# Patient Record
Sex: MALE | Race: OTHER | HISPANIC OR LATINO | URBAN - METROPOLITAN AREA
[De-identification: names, ages, dates, MRNs, and addresses within clinical notes are randomized per-mention and may not be internally consistent; named-entity substitution may affect disease eponyms.]

---

## 2021-09-26 ENCOUNTER — INPATIENT (INPATIENT)
Facility: HOSPITAL | Age: 26
LOS: 2 days | Discharge: HOME | DRG: 957 | End: 2021-09-29
Attending: SURGERY | Admitting: SURGERY
Payer: COMMERCIAL

## 2021-09-26 VITALS — OXYGEN SATURATION: 98 % | HEART RATE: 90 BPM

## 2021-09-26 DIAGNOSIS — S09.8XXA OTHER SPECIFIED INJURIES OF HEAD, INITIAL ENCOUNTER: ICD-10-CM

## 2021-09-26 LAB
ABO RH CONFIRMATION: SIGNIFICANT CHANGE UP
ALBUMIN SERPL ELPH-MCNC: 5.2 G/DL — SIGNIFICANT CHANGE UP (ref 3.3–5.2)
ALP SERPL-CCNC: 104 U/L — SIGNIFICANT CHANGE UP (ref 40–120)
ALT FLD-CCNC: 45 U/L — HIGH
AMPHET UR-MCNC: NEGATIVE — SIGNIFICANT CHANGE UP
ANION GAP SERPL CALC-SCNC: 13 MMOL/L — SIGNIFICANT CHANGE UP (ref 5–17)
ANION GAP SERPL CALC-SCNC: 36 MMOL/L — HIGH (ref 5–17)
APTT BLD: 27.7 SEC — SIGNIFICANT CHANGE UP (ref 27.5–35.5)
AST SERPL-CCNC: 75 U/L — HIGH
BARBITURATES UR SCN-MCNC: NEGATIVE — SIGNIFICANT CHANGE UP
BASOPHILS # BLD AUTO: 0 K/UL — SIGNIFICANT CHANGE UP (ref 0–0.2)
BASOPHILS # BLD AUTO: 0.02 K/UL — SIGNIFICANT CHANGE UP (ref 0–0.2)
BASOPHILS NFR BLD AUTO: 0 % — SIGNIFICANT CHANGE UP (ref 0–2)
BASOPHILS NFR BLD AUTO: 0.2 % — SIGNIFICANT CHANGE UP (ref 0–2)
BENZODIAZ UR-MCNC: NEGATIVE — SIGNIFICANT CHANGE UP
BILIRUB SERPL-MCNC: <0.2 MG/DL — LOW (ref 0.4–2)
BLD GP AB SCN SERPL QL: SIGNIFICANT CHANGE UP
BUN SERPL-MCNC: 13.7 MG/DL — SIGNIFICANT CHANGE UP (ref 8–20)
BUN SERPL-MCNC: 15 MG/DL — SIGNIFICANT CHANGE UP (ref 8–20)
CALCIUM SERPL-MCNC: 8 MG/DL — LOW (ref 8.6–10.2)
CALCIUM SERPL-MCNC: 9.3 MG/DL — SIGNIFICANT CHANGE UP (ref 8.6–10.2)
CHLORIDE SERPL-SCNC: 105 MMOL/L — SIGNIFICANT CHANGE UP (ref 98–107)
CHLORIDE SERPL-SCNC: 96 MMOL/L — LOW (ref 98–107)
CO2 SERPL-SCNC: 20 MMOL/L — LOW (ref 22–29)
CO2 SERPL-SCNC: 8 MMOL/L — CRITICAL LOW (ref 22–29)
COCAINE METAB.OTHER UR-MCNC: POSITIVE
CREAT SERPL-MCNC: 1.28 MG/DL — SIGNIFICANT CHANGE UP (ref 0.5–1.3)
CREAT SERPL-MCNC: 1.79 MG/DL — HIGH (ref 0.5–1.3)
EOSINOPHIL # BLD AUTO: 0.03 K/UL — SIGNIFICANT CHANGE UP (ref 0–0.5)
EOSINOPHIL # BLD AUTO: 0.17 K/UL — SIGNIFICANT CHANGE UP (ref 0–0.5)
EOSINOPHIL NFR BLD AUTO: 0.2 % — SIGNIFICANT CHANGE UP (ref 0–6)
EOSINOPHIL NFR BLD AUTO: 0.8 % — SIGNIFICANT CHANGE UP (ref 0–6)
ETHANOL SERPL-MCNC: 121 MG/DL — HIGH (ref 0–9)
GAS PNL BLDA: SIGNIFICANT CHANGE UP
GLUCOSE BLDC GLUCOMTR-MCNC: 102 MG/DL — HIGH (ref 70–99)
GLUCOSE BLDC GLUCOMTR-MCNC: 95 MG/DL — SIGNIFICANT CHANGE UP (ref 70–99)
GLUCOSE SERPL-MCNC: 119 MG/DL — HIGH (ref 70–99)
GLUCOSE SERPL-MCNC: 302 MG/DL — HIGH (ref 70–99)
HCT VFR BLD CALC: 33.1 % — LOW (ref 39–50)
HCT VFR BLD CALC: 35.7 % — LOW (ref 39–50)
HCT VFR BLD CALC: 36.5 % — LOW (ref 39–50)
HCT VFR BLD CALC: 49.2 % — SIGNIFICANT CHANGE UP (ref 39–50)
HGB BLD-MCNC: 11.5 G/DL — LOW (ref 13–17)
HGB BLD-MCNC: 12.3 G/DL — LOW (ref 13–17)
HGB BLD-MCNC: 12.5 G/DL — LOW (ref 13–17)
HGB BLD-MCNC: 15.6 G/DL — SIGNIFICANT CHANGE UP (ref 13–17)
IMM GRANULOCYTES NFR BLD AUTO: 0.4 % — SIGNIFICANT CHANGE UP (ref 0–1.5)
INR BLD: 0.99 RATIO — SIGNIFICANT CHANGE UP (ref 0.88–1.16)
LACTATE BLDV-MCNC: >20 MMOL/L — SIGNIFICANT CHANGE UP (ref 0.5–2)
LYMPHOCYTES # BLD AUTO: 17.8 % — SIGNIFICANT CHANGE UP (ref 13–44)
LYMPHOCYTES # BLD AUTO: 2.3 K/UL — SIGNIFICANT CHANGE UP (ref 1–3.3)
LYMPHOCYTES # BLD AUTO: 31.5 % — SIGNIFICANT CHANGE UP (ref 13–44)
LYMPHOCYTES # BLD AUTO: 6.73 K/UL — HIGH (ref 1–3.3)
MAGNESIUM SERPL-MCNC: 2.6 MG/DL — SIGNIFICANT CHANGE UP (ref 1.6–2.6)
MANUAL SMEAR VERIFICATION: SIGNIFICANT CHANGE UP
MCHC RBC-ENTMCNC: 29.8 PG — SIGNIFICANT CHANGE UP (ref 27–34)
MCHC RBC-ENTMCNC: 30 PG — SIGNIFICANT CHANGE UP (ref 27–34)
MCHC RBC-ENTMCNC: 30.2 PG — SIGNIFICANT CHANGE UP (ref 27–34)
MCHC RBC-ENTMCNC: 30.7 PG — SIGNIFICANT CHANGE UP (ref 27–34)
MCHC RBC-ENTMCNC: 31.7 GM/DL — LOW (ref 32–36)
MCHC RBC-ENTMCNC: 34.2 GM/DL — SIGNIFICANT CHANGE UP (ref 32–36)
MCHC RBC-ENTMCNC: 34.5 GM/DL — SIGNIFICANT CHANGE UP (ref 32–36)
MCHC RBC-ENTMCNC: 34.7 GM/DL — SIGNIFICANT CHANGE UP (ref 32–36)
MCV RBC AUTO: 86.4 FL — SIGNIFICANT CHANGE UP (ref 80–100)
MCV RBC AUTO: 87.1 FL — SIGNIFICANT CHANGE UP (ref 80–100)
MCV RBC AUTO: 89 FL — SIGNIFICANT CHANGE UP (ref 80–100)
MCV RBC AUTO: 95.2 FL — SIGNIFICANT CHANGE UP (ref 80–100)
METAMYELOCYTES # FLD: 4 % — HIGH (ref 0–0)
METHADONE UR-MCNC: NEGATIVE — SIGNIFICANT CHANGE UP
MONOCYTES # BLD AUTO: 0.82 K/UL — SIGNIFICANT CHANGE UP (ref 0–0.9)
MONOCYTES # BLD AUTO: 1.05 K/UL — HIGH (ref 0–0.9)
MONOCYTES NFR BLD AUTO: 4.9 % — SIGNIFICANT CHANGE UP (ref 2–14)
MONOCYTES NFR BLD AUTO: 6.4 % — SIGNIFICANT CHANGE UP (ref 2–14)
MYELOCYTES NFR BLD: 1.6 % — HIGH (ref 0–0)
NEUTROPHILS # BLD AUTO: 11.89 K/UL — HIGH (ref 1.8–7.4)
NEUTROPHILS # BLD AUTO: 9.67 K/UL — HIGH (ref 1.8–7.4)
NEUTROPHILS NFR BLD AUTO: 54.8 % — SIGNIFICANT CHANGE UP (ref 43–77)
NEUTROPHILS NFR BLD AUTO: 75 % — SIGNIFICANT CHANGE UP (ref 43–77)
NEUTS BAND # BLD: 0.8 % — SIGNIFICANT CHANGE UP (ref 0–8)
OPIATES UR-MCNC: NEGATIVE — SIGNIFICANT CHANGE UP
PCP SPEC-MCNC: SIGNIFICANT CHANGE UP
PCP UR-MCNC: NEGATIVE — SIGNIFICANT CHANGE UP
PHOSPHATE SERPL-MCNC: 3.5 MG/DL — SIGNIFICANT CHANGE UP (ref 2.4–4.7)
PLAT MORPH BLD: NORMAL — SIGNIFICANT CHANGE UP
PLATELET # BLD AUTO: 215 K/UL — SIGNIFICANT CHANGE UP (ref 150–400)
PLATELET # BLD AUTO: 223 K/UL — SIGNIFICANT CHANGE UP (ref 150–400)
PLATELET # BLD AUTO: 225 K/UL — SIGNIFICANT CHANGE UP (ref 150–400)
PLATELET # BLD AUTO: 345 K/UL — SIGNIFICANT CHANGE UP (ref 150–400)
POTASSIUM SERPL-MCNC: 3.6 MMOL/L — SIGNIFICANT CHANGE UP (ref 3.5–5.3)
POTASSIUM SERPL-MCNC: 4.1 MMOL/L — SIGNIFICANT CHANGE UP (ref 3.5–5.3)
POTASSIUM SERPL-SCNC: 3.6 MMOL/L — SIGNIFICANT CHANGE UP (ref 3.5–5.3)
POTASSIUM SERPL-SCNC: 4.1 MMOL/L — SIGNIFICANT CHANGE UP (ref 3.5–5.3)
PROT SERPL-MCNC: 8.5 G/DL — SIGNIFICANT CHANGE UP (ref 6.6–8.7)
PROTHROM AB SERPL-ACNC: 11.5 SEC — SIGNIFICANT CHANGE UP (ref 10.6–13.6)
RBC # BLD: 3.83 M/UL — LOW (ref 4.2–5.8)
RBC # BLD: 4.01 M/UL — LOW (ref 4.2–5.8)
RBC # BLD: 4.19 M/UL — LOW (ref 4.2–5.8)
RBC # BLD: 5.17 M/UL — SIGNIFICANT CHANGE UP (ref 4.2–5.8)
RBC # FLD: 12.6 % — SIGNIFICANT CHANGE UP (ref 10.3–14.5)
RBC # FLD: 13 % — SIGNIFICANT CHANGE UP (ref 10.3–14.5)
RBC # FLD: 13 % — SIGNIFICANT CHANGE UP (ref 10.3–14.5)
RBC # FLD: 13.1 % — SIGNIFICANT CHANGE UP (ref 10.3–14.5)
RBC BLD AUTO: NORMAL — SIGNIFICANT CHANGE UP
SARS-COV-2 RNA SPEC QL NAA+PROBE: SIGNIFICANT CHANGE UP
SODIUM SERPL-SCNC: 138 MMOL/L — SIGNIFICANT CHANGE UP (ref 135–145)
SODIUM SERPL-SCNC: 140 MMOL/L — SIGNIFICANT CHANGE UP (ref 135–145)
THC UR QL: POSITIVE
VARIANT LYMPHS # BLD: 1.6 % — SIGNIFICANT CHANGE UP (ref 0–6)
WBC # BLD: 12.89 K/UL — HIGH (ref 3.8–10.5)
WBC # BLD: 13.73 K/UL — HIGH (ref 3.8–10.5)
WBC # BLD: 15.69 K/UL — HIGH (ref 3.8–10.5)
WBC # BLD: 21.38 K/UL — HIGH (ref 3.8–10.5)
WBC # FLD AUTO: 12.89 K/UL — HIGH (ref 3.8–10.5)
WBC # FLD AUTO: 13.73 K/UL — HIGH (ref 3.8–10.5)
WBC # FLD AUTO: 15.69 K/UL — HIGH (ref 3.8–10.5)
WBC # FLD AUTO: 21.38 K/UL — HIGH (ref 3.8–10.5)

## 2021-09-26 PROCEDURE — 72128 CT CHEST SPINE W/O DYE: CPT | Mod: 26

## 2021-09-26 PROCEDURE — 71045 X-RAY EXAM CHEST 1 VIEW: CPT | Mod: 26

## 2021-09-26 PROCEDURE — 99291 CRITICAL CARE FIRST HOUR: CPT

## 2021-09-26 PROCEDURE — 70486 CT MAXILLOFACIAL W/O DYE: CPT | Mod: 26,MA

## 2021-09-26 PROCEDURE — 71260 CT THORAX DX C+: CPT | Mod: 26,MA

## 2021-09-26 PROCEDURE — 93010 ELECTROCARDIOGRAM REPORT: CPT

## 2021-09-26 PROCEDURE — 72125 CT NECK SPINE W/O DYE: CPT | Mod: 26,MA

## 2021-09-26 PROCEDURE — 71045 X-RAY EXAM CHEST 1 VIEW: CPT | Mod: 26,77

## 2021-09-26 PROCEDURE — 72131 CT LUMBAR SPINE W/O DYE: CPT | Mod: 26

## 2021-09-26 PROCEDURE — 74177 CT ABD & PELVIS W/CONTRAST: CPT | Mod: 26,MA

## 2021-09-26 PROCEDURE — 70450 CT HEAD/BRAIN W/O DYE: CPT | Mod: 26,MA

## 2021-09-26 RX ORDER — INSULIN LISPRO 100/ML
VIAL (ML) SUBCUTANEOUS EVERY 6 HOURS
Refills: 0 | Status: DISCONTINUED | OUTPATIENT
Start: 2021-09-26 | End: 2021-09-27

## 2021-09-26 RX ORDER — ACETAMINOPHEN 500 MG
975 TABLET ORAL EVERY 6 HOURS
Refills: 0 | Status: DISCONTINUED | OUTPATIENT
Start: 2021-09-26 | End: 2021-09-27

## 2021-09-26 RX ORDER — FENTANYL CITRATE 50 UG/ML
100 INJECTION INTRAVENOUS ONCE
Refills: 0 | Status: DISCONTINUED | OUTPATIENT
Start: 2021-09-26 | End: 2021-09-26

## 2021-09-26 RX ORDER — SODIUM CHLORIDE 9 MG/ML
1000 INJECTION, SOLUTION INTRAVENOUS ONCE
Refills: 0 | Status: COMPLETED | OUTPATIENT
Start: 2021-09-26 | End: 2021-09-26

## 2021-09-26 RX ORDER — DEXTROSE 50 % IN WATER 50 %
12.5 SYRINGE (ML) INTRAVENOUS ONCE
Refills: 0 | Status: DISCONTINUED | OUTPATIENT
Start: 2021-09-26 | End: 2021-09-27

## 2021-09-26 RX ORDER — THIAMINE MONONITRATE (VIT B1) 100 MG
500 TABLET ORAL EVERY 8 HOURS
Refills: 0 | Status: DISCONTINUED | OUTPATIENT
Start: 2021-09-26 | End: 2021-09-28

## 2021-09-26 RX ORDER — SODIUM CHLORIDE 9 MG/ML
1000 INJECTION, SOLUTION INTRAVENOUS
Refills: 0 | Status: DISCONTINUED | OUTPATIENT
Start: 2021-09-26 | End: 2021-09-28

## 2021-09-26 RX ORDER — INSULIN LISPRO 100/ML
VIAL (ML) SUBCUTANEOUS EVERY 4 HOURS
Refills: 0 | Status: DISCONTINUED | OUTPATIENT
Start: 2021-09-26 | End: 2021-09-26

## 2021-09-26 RX ORDER — CALCIUM GLUCONATE 100 MG/ML
2 VIAL (ML) INTRAVENOUS ONCE
Refills: 0 | Status: COMPLETED | OUTPATIENT
Start: 2021-09-26 | End: 2021-09-26

## 2021-09-26 RX ORDER — DEXTROSE 50 % IN WATER 50 %
25 SYRINGE (ML) INTRAVENOUS ONCE
Refills: 0 | Status: DISCONTINUED | OUTPATIENT
Start: 2021-09-26 | End: 2021-09-27

## 2021-09-26 RX ORDER — SODIUM CHLORIDE 9 MG/ML
1000 INJECTION, SOLUTION INTRAVENOUS
Refills: 0 | Status: DISCONTINUED | OUTPATIENT
Start: 2021-09-26 | End: 2021-09-27

## 2021-09-26 RX ORDER — FENTANYL CITRATE 50 UG/ML
0.5 INJECTION INTRAVENOUS
Qty: 2500 | Refills: 0 | Status: DISCONTINUED | OUTPATIENT
Start: 2021-09-26 | End: 2021-09-27

## 2021-09-26 RX ORDER — THIAMINE MONONITRATE (VIT B1) 100 MG
50 TABLET ORAL EVERY 8 HOURS
Refills: 0 | Status: DISCONTINUED | OUTPATIENT
Start: 2021-09-26 | End: 2021-09-26

## 2021-09-26 RX ORDER — CEFAZOLIN SODIUM 1 G
VIAL (EA) INJECTION
Refills: 0 | Status: DISCONTINUED | OUTPATIENT
Start: 2021-09-26 | End: 2021-09-26

## 2021-09-26 RX ORDER — FENTANYL CITRATE 50 UG/ML
50 INJECTION INTRAVENOUS ONCE
Refills: 0 | Status: DISCONTINUED | OUTPATIENT
Start: 2021-09-26 | End: 2021-09-26

## 2021-09-26 RX ORDER — CHLORHEXIDINE GLUCONATE 213 G/1000ML
15 SOLUTION TOPICAL EVERY 12 HOURS
Refills: 0 | Status: DISCONTINUED | OUTPATIENT
Start: 2021-09-26 | End: 2021-09-27

## 2021-09-26 RX ORDER — FENTANYL CITRATE 50 UG/ML
0.5 INJECTION INTRAVENOUS
Qty: 2500 | Refills: 0 | Status: DISCONTINUED | OUTPATIENT
Start: 2021-09-26 | End: 2021-09-26

## 2021-09-26 RX ORDER — THIAMINE MONONITRATE (VIT B1) 100 MG
500 TABLET ORAL EVERY 8 HOURS
Refills: 0 | Status: DISCONTINUED | OUTPATIENT
Start: 2021-09-26 | End: 2021-09-26

## 2021-09-26 RX ORDER — CEFAZOLIN SODIUM 1 G
2000 VIAL (EA) INJECTION EVERY 8 HOURS
Refills: 0 | Status: DISCONTINUED | OUTPATIENT
Start: 2021-09-26 | End: 2021-09-26

## 2021-09-26 RX ORDER — CEFAZOLIN SODIUM 1 G
2000 VIAL (EA) INJECTION ONCE
Refills: 0 | Status: COMPLETED | OUTPATIENT
Start: 2021-09-26 | End: 2021-09-26

## 2021-09-26 RX ORDER — TETANUS AND DIPHTHERIA TOXOIDS ADSORBED 2; 2 [LF]/.5ML; [LF]/.5ML
0.5 INJECTION INTRAMUSCULAR ONCE
Refills: 0 | Status: COMPLETED | OUTPATIENT
Start: 2021-09-26 | End: 2021-09-26

## 2021-09-26 RX ORDER — DEXTROSE 50 % IN WATER 50 %
15 SYRINGE (ML) INTRAVENOUS ONCE
Refills: 0 | Status: DISCONTINUED | OUTPATIENT
Start: 2021-09-26 | End: 2021-09-27

## 2021-09-26 RX ORDER — SODIUM CHLORIDE 9 MG/ML
1000 INJECTION INTRAMUSCULAR; INTRAVENOUS; SUBCUTANEOUS ONCE
Refills: 0 | Status: COMPLETED | OUTPATIENT
Start: 2021-09-26 | End: 2021-09-26

## 2021-09-26 RX ORDER — PROPOFOL 10 MG/ML
10 INJECTION, EMULSION INTRAVENOUS
Qty: 1000 | Refills: 0 | Status: DISCONTINUED | OUTPATIENT
Start: 2021-09-26 | End: 2021-09-27

## 2021-09-26 RX ORDER — PANTOPRAZOLE SODIUM 20 MG/1
40 TABLET, DELAYED RELEASE ORAL DAILY
Refills: 0 | Status: DISCONTINUED | OUTPATIENT
Start: 2021-09-26 | End: 2021-09-28

## 2021-09-26 RX ORDER — FOLIC ACID 0.8 MG
1 TABLET ORAL ONCE
Refills: 0 | Status: COMPLETED | OUTPATIENT
Start: 2021-09-26 | End: 2021-09-26

## 2021-09-26 RX ORDER — GLUCAGON INJECTION, SOLUTION 0.5 MG/.1ML
1 INJECTION, SOLUTION SUBCUTANEOUS ONCE
Refills: 0 | Status: DISCONTINUED | OUTPATIENT
Start: 2021-09-26 | End: 2021-09-27

## 2021-09-26 RX ADMIN — Medication 105 MILLIGRAM(S): at 13:23

## 2021-09-26 RX ADMIN — SODIUM CHLORIDE 2000 MILLILITER(S): 9 INJECTION, SOLUTION INTRAVENOUS at 05:08

## 2021-09-26 RX ADMIN — FENTANYL CITRATE 50 MICROGRAM(S): 50 INJECTION INTRAVENOUS at 06:00

## 2021-09-26 RX ADMIN — Medication 105 MILLIGRAM(S): at 21:56

## 2021-09-26 RX ADMIN — Medication 100 MILLIGRAM(S): at 06:35

## 2021-09-26 RX ADMIN — Medication 200 GRAM(S): at 15:00

## 2021-09-26 RX ADMIN — PROPOFOL 3.6 MICROGRAM(S)/KG/MIN: 10 INJECTION, EMULSION INTRAVENOUS at 14:39

## 2021-09-26 RX ADMIN — PROPOFOL 3.6 MICROGRAM(S)/KG/MIN: 10 INJECTION, EMULSION INTRAVENOUS at 18:12

## 2021-09-26 RX ADMIN — Medication 100 MILLIGRAM(S): at 13:19

## 2021-09-26 RX ADMIN — CHLORHEXIDINE GLUCONATE 15 MILLILITER(S): 213 SOLUTION TOPICAL at 18:12

## 2021-09-26 RX ADMIN — PROPOFOL 3.6 MICROGRAM(S)/KG/MIN: 10 INJECTION, EMULSION INTRAVENOUS at 06:43

## 2021-09-26 RX ADMIN — Medication 1 MILLIGRAM(S): at 13:22

## 2021-09-26 RX ADMIN — FENTANYL CITRATE 50 MICROGRAM(S): 50 INJECTION INTRAVENOUS at 05:58

## 2021-09-26 RX ADMIN — TETANUS AND DIPHTHERIA TOXOIDS ADSORBED 0.5 MILLILITER(S): 2; 2 INJECTION INTRAMUSCULAR at 05:40

## 2021-09-26 RX ADMIN — SODIUM CHLORIDE 100 MILLILITER(S): 9 INJECTION, SOLUTION INTRAVENOUS at 18:12

## 2021-09-26 RX ADMIN — SODIUM CHLORIDE 150 MILLILITER(S): 9 INJECTION, SOLUTION INTRAVENOUS at 08:13

## 2021-09-26 RX ADMIN — PROPOFOL 3.6 MICROGRAM(S)/KG/MIN: 10 INJECTION, EMULSION INTRAVENOUS at 08:13

## 2021-09-26 RX ADMIN — SODIUM CHLORIDE 150 MILLILITER(S): 9 INJECTION, SOLUTION INTRAVENOUS at 13:22

## 2021-09-26 RX ADMIN — SODIUM CHLORIDE 2000 MILLILITER(S): 9 INJECTION INTRAMUSCULAR; INTRAVENOUS; SUBCUTANEOUS at 06:43

## 2021-09-26 RX ADMIN — FENTANYL CITRATE 100 MICROGRAM(S): 50 INJECTION INTRAVENOUS at 05:41

## 2021-09-26 RX ADMIN — FENTANYL CITRATE 3.5 MICROGRAM(S)/KG/HR: 50 INJECTION INTRAVENOUS at 08:13

## 2021-09-26 RX ADMIN — FENTANYL CITRATE 100 MICROGRAM(S): 50 INJECTION INTRAVENOUS at 06:00

## 2021-09-26 RX ADMIN — PROPOFOL 3.6 MICROGRAM(S)/KG/MIN: 10 INJECTION, EMULSION INTRAVENOUS at 23:04

## 2021-09-26 NOTE — H&P ADULT - NSHPPHYSICALEXAM_GEN_ALL_CORE
Constitutional: Well-developed Male, moderate distress    Neurological: GCS: 13 A&O x 2; no gross sensory / motor / coordination deficits    HEENT: Head is normocephalic,+ blood from nares and oropharynx, no goodrich sign / racoon eyes, EOMI b/l, pupils 2 mm round and reactive to light b/l, Laceration to left eyebrow and R chin    Neck: cervical collar in place, trachea midline    Respiratory: Breath sounds CTA b/l respirations are unlabored, no accessory muscle use, no conversational dyspnea    Cardiovascular: No subQ emphysema or crepitus palpated    Gastrointestinal: Abdomen soft, non-tender, non-distended, no rebound tenderness / guarding, no ecchymosis or external signs of abdominal trauma    Pelvis: stable    Neurospinal: C/T/L/S spines have no tenderness to palpation, no step-offs or signs of external trauma.    Musculoskeletal: moving all extremities spontaneously. No point tenderness, instability, or gross msk deformity noted to upper or lower extremities bilaterally.  5/5 strength of upper and lower extremities bilaterally.  Monitor on R ankle.    Vascular: 2+ radial, femoral, and DP pulses b/l Constitutional: Well-developed Male, moderate distress    Neurological: GCS: 13 A&O x 2; no gross sensory / motor / coordination deficits    HEENT: Head is normocephalic,+ blood from nares and oropharynx, no goodrich sign / racoon eyes, EOMI b/l, pupils 2 mm round and reactive to light b/l, Laceration to left eyebrow and R chin, R posterior occipital laceration, R ear tragus and lobe laceration    Neck: cervical collar in place, trachea midline    Respiratory: Breath sounds CTA b/l respirations are unlabored, no accessory muscle use, no conversational dyspnea    Cardiovascular: No subQ emphysema or crepitus palpated    Gastrointestinal: Abdomen soft, non-tender, non-distended, no rebound tenderness / guarding, no ecchymosis or external signs of abdominal trauma    Pelvis: stable    Neurospinal: C/T/L/S spines have no tenderness to palpation, no step-offs or signs of external trauma.    Musculoskeletal: moving all extremities spontaneously. No point tenderness, instability, or gross msk deformity noted to upper or lower extremities bilaterally.  5/5 strength of upper and lower extremities bilaterally.  Monitor on R ankle.    Vascular: 2+ radial, femoral, and DP pulses b/l
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
## 2021-09-26 NOTE — ED PROVIDER NOTE - ADDITIONAL RISK FACTOR FREE TEXT BOX
I have personally provided 38___ minutes of critical care time exclusive of time spent on separately billable procedures. Time includes review of laboratory data, radiology results, discussion with consultants, and monitoring for potential decompensation. Interventions were performed as documented above
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
## 2021-09-26 NOTE — ED ADULT NURSE NOTE - CAS EDN DISCHARGE ASSESSMENT
GI Discharge Instructions Endoscopy      2/11/2020    During your exam, the physician:    Completed a thorough exam, no specimens were obtained.    DIET INSTRUCTIONS:  Resume your regular diet    PRESCRIPTIONS/MEDICATIONS  No new prescriptions given today    A RESPONSIBLE ADULT MUST ACCOMPANY YOU AND DRIVE YOU HOME    You had the following procedure(s) today:   Colonoscopy    1. If a biopsy and/or a polyp removal was performed today.   There is no need to hold any aspirin or anti-inflammatory medications.   2. Following sedation, your judgement, perception and coordination are impaired.      Therefore, TODAY:  · Do not drive.  Do not return to work today.  · Do not operate appliances or machinery that require quick reaction time for 24 hours.  · Do not sign legal documents or be involved in work decisions for 24 hours.  · Do not smoke or drink alcoholic beverages for 24 hours.  · Plan to spend a few hours resting before resuming your normal routine    Please call your physician in the event that you experience any of the following or proceed to the nearest hospital in the event of an emergency:     COLONOSCOPY  Fever  Severe abdominal distention or pain. Some mild distention and/or cramping are normal after these procedures but should pass within an hour or two with the passage of air.  Rectal bleeding more than blood streaking on the toilet  tissue  Nausea or Vomiting    If you have any questions or concerns, contact Dr. ABDOULAYE Rollins.    ADDITIONAL INSTRUCTIONS: see attached sheets           intubated no
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
## 2021-09-26 NOTE — PROCEDURE NOTE - ADDITIONAL PROCEDURE DETAILS
Tracheal intubation with glidescope. Blood noted in the airway requiring suction. 7.5 ETT secured at 24cm at the lip. Position confirmed with end tidal CO2, +chest excursion, and chest xray. Meds: etomidate, succinylcholine, and rocuronium
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
## 2021-09-27 LAB
A1C WITH ESTIMATED AVERAGE GLUCOSE RESULT: 5.5 % — SIGNIFICANT CHANGE UP (ref 4–5.6)
ANION GAP SERPL CALC-SCNC: 13 MMOL/L — SIGNIFICANT CHANGE UP (ref 5–17)
BASOPHILS # BLD AUTO: 0.05 K/UL — SIGNIFICANT CHANGE UP (ref 0–0.2)
BASOPHILS NFR BLD AUTO: 0.4 % — SIGNIFICANT CHANGE UP (ref 0–2)
BUN SERPL-MCNC: 17.2 MG/DL — SIGNIFICANT CHANGE UP (ref 8–20)
CALCIUM SERPL-MCNC: 8.6 MG/DL — SIGNIFICANT CHANGE UP (ref 8.6–10.2)
CHLORIDE SERPL-SCNC: 108 MMOL/L — HIGH (ref 98–107)
CK MB CFR SERPL CALC: 11.9 NG/ML — HIGH (ref 0–6.7)
CK MB CFR SERPL CALC: 6 NG/ML — SIGNIFICANT CHANGE UP (ref 0–6.7)
CK SERPL-CCNC: 5717 U/L — HIGH (ref 30–200)
CK SERPL-CCNC: 7920 U/L — HIGH (ref 30–200)
CO2 SERPL-SCNC: 20 MMOL/L — LOW (ref 22–29)
COVID-19 SPIKE DOMAIN AB INTERP: POSITIVE
COVID-19 SPIKE DOMAIN ANTIBODY RESULT: 102 U/ML — HIGH
CREAT SERPL-MCNC: 1.17 MG/DL — SIGNIFICANT CHANGE UP (ref 0.5–1.3)
EOSINOPHIL # BLD AUTO: 0.05 K/UL — SIGNIFICANT CHANGE UP (ref 0–0.5)
EOSINOPHIL NFR BLD AUTO: 0.4 % — SIGNIFICANT CHANGE UP (ref 0–6)
ESTIMATED AVERAGE GLUCOSE: 111 MG/DL — SIGNIFICANT CHANGE UP (ref 68–114)
GAS PNL BLDA: SIGNIFICANT CHANGE UP
GLUCOSE BLDC GLUCOMTR-MCNC: 122 MG/DL — HIGH (ref 70–99)
GLUCOSE BLDC GLUCOMTR-MCNC: 94 MG/DL — SIGNIFICANT CHANGE UP (ref 70–99)
GLUCOSE SERPL-MCNC: 104 MG/DL — HIGH (ref 70–99)
HCT VFR BLD CALC: 32.8 % — LOW (ref 39–50)
HGB BLD-MCNC: 11.4 G/DL — LOW (ref 13–17)
IMM GRANULOCYTES NFR BLD AUTO: 0.4 % — SIGNIFICANT CHANGE UP (ref 0–1.5)
LYMPHOCYTES # BLD AUTO: 16.4 % — SIGNIFICANT CHANGE UP (ref 13–44)
LYMPHOCYTES # BLD AUTO: 2.07 K/UL — SIGNIFICANT CHANGE UP (ref 1–3.3)
MAGNESIUM SERPL-MCNC: 2.3 MG/DL — SIGNIFICANT CHANGE UP (ref 1.6–2.6)
MCHC RBC-ENTMCNC: 29.8 PG — SIGNIFICANT CHANGE UP (ref 27–34)
MCHC RBC-ENTMCNC: 34.8 GM/DL — SIGNIFICANT CHANGE UP (ref 32–36)
MCV RBC AUTO: 85.9 FL — SIGNIFICANT CHANGE UP (ref 80–100)
MONOCYTES # BLD AUTO: 0.96 K/UL — HIGH (ref 0–0.9)
MONOCYTES NFR BLD AUTO: 7.6 % — SIGNIFICANT CHANGE UP (ref 2–14)
NEUTROPHILS # BLD AUTO: 9.47 K/UL — HIGH (ref 1.8–7.4)
NEUTROPHILS NFR BLD AUTO: 74.8 % — SIGNIFICANT CHANGE UP (ref 43–77)
PHOSPHATE SERPL-MCNC: 1.8 MG/DL — LOW (ref 2.4–4.7)
PLATELET # BLD AUTO: 213 K/UL — SIGNIFICANT CHANGE UP (ref 150–400)
POTASSIUM SERPL-MCNC: 3.6 MMOL/L — SIGNIFICANT CHANGE UP (ref 3.5–5.3)
POTASSIUM SERPL-SCNC: 3.6 MMOL/L — SIGNIFICANT CHANGE UP (ref 3.5–5.3)
RBC # BLD: 3.82 M/UL — LOW (ref 4.2–5.8)
RBC # FLD: 13.2 % — SIGNIFICANT CHANGE UP (ref 10.3–14.5)
SARS-COV-2 IGG+IGM SERPL QL IA: 102 U/ML — HIGH
SARS-COV-2 IGG+IGM SERPL QL IA: POSITIVE
SODIUM SERPL-SCNC: 141 MMOL/L — SIGNIFICANT CHANGE UP (ref 135–145)
WBC # BLD: 12.65 K/UL — HIGH (ref 3.8–10.5)
WBC # FLD AUTO: 12.65 K/UL — HIGH (ref 3.8–10.5)

## 2021-09-27 PROCEDURE — 99291 CRITICAL CARE FIRST HOUR: CPT

## 2021-09-27 RX ORDER — ENOXAPARIN SODIUM 100 MG/ML
40 INJECTION SUBCUTANEOUS
Refills: 0 | Status: DISCONTINUED | OUTPATIENT
Start: 2021-09-27 | End: 2021-09-27

## 2021-09-27 RX ORDER — POTASSIUM PHOSPHATE, MONOBASIC POTASSIUM PHOSPHATE, DIBASIC 236; 224 MG/ML; MG/ML
30 INJECTION, SOLUTION INTRAVENOUS ONCE
Refills: 0 | Status: COMPLETED | OUTPATIENT
Start: 2021-09-27 | End: 2021-09-27

## 2021-09-27 RX ORDER — OXYCODONE HYDROCHLORIDE 5 MG/1
10 TABLET ORAL EVERY 4 HOURS
Refills: 0 | Status: DISCONTINUED | OUTPATIENT
Start: 2021-09-27 | End: 2021-09-28

## 2021-09-27 RX ORDER — OXYCODONE HYDROCHLORIDE 5 MG/1
5 TABLET ORAL EVERY 4 HOURS
Refills: 0 | Status: DISCONTINUED | OUTPATIENT
Start: 2021-09-27 | End: 2021-09-28

## 2021-09-27 RX ORDER — CALCIUM GLUCONATE 100 MG/ML
2 VIAL (ML) INTRAVENOUS ONCE
Refills: 0 | Status: COMPLETED | OUTPATIENT
Start: 2021-09-27 | End: 2021-09-27

## 2021-09-27 RX ORDER — CHLORHEXIDINE GLUCONATE 213 G/1000ML
1 SOLUTION TOPICAL DAILY
Refills: 0 | Status: DISCONTINUED | OUTPATIENT
Start: 2021-09-27 | End: 2021-09-28

## 2021-09-27 RX ORDER — CHLORHEXIDINE GLUCONATE 213 G/1000ML
15 SOLUTION TOPICAL EVERY 12 HOURS
Refills: 0 | Status: DISCONTINUED | OUTPATIENT
Start: 2021-09-27 | End: 2021-09-28

## 2021-09-27 RX ORDER — FOLIC ACID 0.8 MG
1 TABLET ORAL DAILY
Refills: 0 | Status: DISCONTINUED | OUTPATIENT
Start: 2021-09-27 | End: 2021-09-28

## 2021-09-27 RX ORDER — ACETAMINOPHEN 500 MG
975 TABLET ORAL EVERY 6 HOURS
Refills: 0 | Status: DISCONTINUED | OUTPATIENT
Start: 2021-09-27 | End: 2021-09-28

## 2021-09-27 RX ADMIN — CHLORHEXIDINE GLUCONATE 1 APPLICATION(S): 213 SOLUTION TOPICAL at 11:42

## 2021-09-27 RX ADMIN — Medication 0: at 05:00

## 2021-09-27 RX ADMIN — POTASSIUM PHOSPHATE, MONOBASIC POTASSIUM PHOSPHATE, DIBASIC 83.33 MILLIMOLE(S): 236; 224 INJECTION, SOLUTION INTRAVENOUS at 08:25

## 2021-09-27 RX ADMIN — Medication 1 MILLIGRAM(S): at 11:32

## 2021-09-27 RX ADMIN — PANTOPRAZOLE SODIUM 40 MILLIGRAM(S): 20 TABLET, DELAYED RELEASE ORAL at 11:33

## 2021-09-27 RX ADMIN — Medication 975 MILLIGRAM(S): at 22:04

## 2021-09-27 RX ADMIN — Medication 200 GRAM(S): at 06:47

## 2021-09-27 RX ADMIN — Medication 105 MILLIGRAM(S): at 23:26

## 2021-09-27 RX ADMIN — Medication 1 TABLET(S): at 11:33

## 2021-09-27 RX ADMIN — Medication 105 MILLIGRAM(S): at 05:02

## 2021-09-27 RX ADMIN — PROPOFOL 3.6 MICROGRAM(S)/KG/MIN: 10 INJECTION, EMULSION INTRAVENOUS at 05:02

## 2021-09-27 RX ADMIN — Medication 105 MILLIGRAM(S): at 16:34

## 2021-09-27 RX ADMIN — SODIUM CHLORIDE 100 MILLILITER(S): 9 INJECTION, SOLUTION INTRAVENOUS at 05:02

## 2021-09-27 RX ADMIN — CHLORHEXIDINE GLUCONATE 15 MILLILITER(S): 213 SOLUTION TOPICAL at 05:02

## 2021-09-27 NOTE — PROGRESS NOTE ADULT - SUBJECTIVE AND OBJECTIVE BOX
24 HOUR EVENTS: Patient following commands, doing well. Denies pain. Patients wife came to bedside overnight. Discussed in length with her his overall hospital course and treatment plan. SBT pending this AM. CXR pending this AM. Plat <20. H/h stable. KELY improving. Lactate remains cleared.     SUBJECTIVE/ROS:  [ ] A ten-point review of systems was otherwise negative except as noted.  [x ] Due to altered mental status/intubation, subjective information were not able to be obtained from the patient. History was obtained, to the extent possible, from review of the chart and collateral sources of information.      NEURO  RASS:  1--1   GCS:   11T  CAM ICU:  Exam: No focal neuro deficits, NUÑEZ, following commands   Meds: acetaminophen   Tablet .. 975 milliGRAM(s) Oral every 6 hours PRN Temp greater or equal to 38.5C (101.3F), Mild Pain (1 - 3)  fentaNYL   Infusion 0.5 MICROgram(s)/kG/Hr IV Continuous <Continuous>  propofol Infusion 10 MICROgram(s)/kG/Min IV Continuous <Continuous>    [x] Adequacy of sedation and pain control has been assessed and adjusted      RESPIRATORY  RR: 22 (09-27-21 @ 03:00) (15 - 22)  SpO2: 100% (09-27-21 @ 04:08) (96% - 100%)  Wt(kg): --  Exam: unlabored, clear to auscultation bilaterally, breath sounds equal bilaterally   Mechanical Ventilation: Mode: AC/ CMV (Assist Control/ Continuous Mandatory Ventilation), RR (machine): 22, RR (patient): 22, TV (machine): 400, FiO2: 30, PEEP: 5, MAP: 9, PIP: 20  ABG - ( 26 Sep 2021 16:04 )  pH: 7.420 /  pCO2: 34    /  pO2: 178   / HCO3: 22    / Base Excess: -2.4  /  SaO2: 98.9    Lactate: x                [ ] Extubation Readiness Assessed  Meds:       CARDIOVASCULAR  HR: 56 (09-27-21 @ 04:08) (56 - 90)  BP: 130/76 (09-27-21 @ 03:00) (116/70 - 148/94)  BP(mean): 90 (09-27-21 @ 03:00) (84 - 111)  ABP: --  ABP(mean): --  Wt(kg): --  CVP(cm H2O): --  VBG - ( 26 Sep 2021 05:36 )  pH: x     /  pCO2: x     /  pO2: x     / HCO3: x     / Base Excess: x     /  SaO2: x      Lactate: >20.00              Exam: NSR  Cardiac Rhythm:  NSR  Perfusion     [x ]Adequate   [ ]Inadequate  Mentation   [x ]Normal       [ ]Reduced  Extremities  [x ]Warm         [ ]Cool  Volume Status [ ]Hypervolemic [x ]Euvolemic [ ]Hypovolemic  Meds:       GI/NUTRITION  Exam: soft, nttp,nd  Diet: NPO   Meds: pantoprazole  Injectable 40 milliGRAM(s) IV Push daily      GENITOURINARY  I&O's Detail    09-26 @ 07:01  -  09-27 @ 04:22  --------------------------------------------------------  IN:    FentaNYL: 60.8 mL    IV PiggyBack: 50 mL    IV PiggyBack: 100 mL    IV PiggyBack: 200 mL    multiple electrolytes Injection Type 1.: 2500 mL    Propofol: 313.9 mL  Total IN: 3224.7 mL    OUT:    Nasogastric/Oral tube (mL): 300 mL    Voided (mL): 2570 mL  Total OUT: 2870 mL    Total NET: 354.7 mL        Weight (kg): 64.6 (09-26 @ 07:15)  09-26    138  |  105  |  13.7  ----------------------------<  119<H>  4.1   |  20.0<L>  |  1.28    Ca    8.0<L>      26 Sep 2021 14:37  Phos  3.5     09-26  Mg     2.6     09-26    TPro  8.5  /  Alb  5.2  /  TBili  <0.2<L>  /  DBili  x   /  AST  75<H>  /  ALT  45<H>  /  AlkPhos  104  09-26    [x ] Polo catheter, indication: critically ill, strict i/o's   Meds: dextrose 5%. 1000 milliLiter(s) IV Continuous <Continuous>  dextrose 5%. 1000 milliLiter(s) IV Continuous <Continuous>  multiple electrolytes Injection Type 1 1000 milliLiter(s) IV Continuous <Continuous>  thiamine IVPB 500 milliGRAM(s) IV Intermittent every 8 hours    Ext: 2+ peripheral pulses, able to ROM    Skin: multiple facial lacerations with adequate skin approximation, stables to stellate lesion of R temporal     HEMATOLOGIC  Meds:   [x] VTE Prophylaxis                        11.5   12.89 )-----------( 215      ( 26 Sep 2021 22:41 )             33.1     PT/INR - ( 26 Sep 2021 05:37 )   PT: 11.5 sec;   INR: 0.99 ratio         PTT - ( 26 Sep 2021 05:37 )  PTT:27.7 sec  Transfusion     [ ] PRBC   [ ] Platelets   [ ] FFP   [ ] Cryoprecipitate      INFECTIOUS DISEASES  T(C): 38 (09-27-21 @ 02:00), Max: 38.2 (09-26-21 @ 20:00)  Wt(kg): --  WBC Count: 12.89 K/uL (09-26 @ 22:41)  WBC Count: 15.69 K/uL (09-26 @ 16:17)  WBC Count: 13.73 K/uL (09-26 @ 15:08)  WBC Count: 21.38 K/uL (09-26 @ 05:13)    Recent Cultures:    Meds:       ENDOCRINE  Capillary Blood Glucose    Meds: dextrose 40% Gel 15 Gram(s) Oral once  dextrose 50% Injectable 25 Gram(s) IV Push once  dextrose 50% Injectable 12.5 Gram(s) IV Push once  dextrose 50% Injectable 25 Gram(s) IV Push once  glucagon  Injectable 1 milliGRAM(s) IntraMuscular once  insulin lispro (ADMELOG) corrective regimen sliding scale   SubCutaneous every 6 hours        ACCESS DEVICES:  [ ] Peripheral IV  [ ] Central Venous Line	[ ] R	[ ] L	[ ] IJ	[ ] Fem	[ ] SC	Placed:   [ ] Arterial Line		[ ] R	[ ] L	[ ] Fem	[ ] Rad	[ ] Ax	Placed:   [ ] PICC:					[ ] Mediport  [ ] Urinary Catheter, Date Placed:   [ ] Necessity of urinary, arterial, and venous catheters discussed    OTHER MEDICATIONS:  chlorhexidine 0.12% Liquid 15 milliLiter(s) Oral Mucosa every 12 hours      CODE STATUS:     IMAGING:
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
## 2021-09-27 NOTE — PROGRESS NOTE ADULT - SUBJECTIVE AND OBJECTIVE BOX
Patient with mandible fracture and orbital fractures requiring ORIF  Plan for ORIF 9/28                        11.4   12.65 )-----------( 213      ( 27 Sep 2021 04:32 )             32.8   09-27    141  |  108<H>  |  17.2  ----------------------------<  104<H>  3.6   |  20.0<L>  |  1.17    Ca    8.6      27 Sep 2021 04:32  Phos  1.8     09-27  Mg     2.3     09-27    TPro  8.5  /  Alb  5.2  /  TBili  <0.2<L>  /  DBili  x   /  AST  75<H>  /  ALT  45<H>  /  AlkPhos  104  09-26  PT/INR - ( 26 Sep 2021 05:37 )   PT: 11.5 sec;   INR: 0.99 ratio         PTT - ( 26 Sep 2021 05:37 )  PTT:27.7 sec    12lead EKG with NSR  CXR reviewed, no progression of occult PTX  type and confirmatory study ordered for AM  Screening COVID PCR ordered for AM  NPO p MN  Appreciate anesthesia screening  No modifiable risk factors noted in this young otherwise healthy male.  Chikis-op ABX per cranio-facial surgeon.

## 2021-09-27 NOTE — PROGRESS NOTE ADULT - SUBJECTIVE AND OBJECTIVE BOX
SICU Downgrade Note    SUBJECTIVE:    Patient made NPO p MN for ORIF on Tuesday 9/28    Vital Signs Last 24 Hrs  T(C): 36.6 (27 Sep 2021 18:21), Max: 38.2 (26 Sep 2021 20:00)  T(F): 97.9 (27 Sep 2021 18:21), Max: 100.8 (26 Sep 2021 20:00)  HR: 71 (27 Sep 2021 18:21) (53 - 107)  BP: 127/89 (27 Sep 2021 18:21) (116/70 - 158/87)  BP(mean): 92 (27 Sep 2021 16:00) (84 - 107)  RR: 18 (27 Sep 2021 18:21) (12 - 22)  SpO2: 95% (27 Sep 2021 18:21) (95% - 100%)    I&O's Summary    26 Sep 2021 07:01  -  27 Sep 2021 07:00  --------------------------------------------------------  IN: 3670 mL / OUT: 3085 mL / NET: 585 mL    27 Sep 2021 07:01  -  27 Sep 2021 18:54  --------------------------------------------------------  IN: 1521.4 mL / OUT: 650 mL / NET: 871.4 mL      I&O's Detail    26 Sep 2021 07:01  -  27 Sep 2021 07:00  --------------------------------------------------------  IN:    FentaNYL: 73.6 mL    IV PiggyBack: 50 mL    IV PiggyBack: 100 mL    IV PiggyBack: 200 mL    multiple electrolytes Injection Type 1.: 2900 mL    Propofol: 346.4 mL  Total IN: 3670 mL    OUT:    Nasogastric/Oral tube (mL): 400 mL    Voided (mL): 2685 mL  Total OUT: 3085 mL    Total NET: 585 mL      27 Sep 2021 07:01  -  27 Sep 2021 18:54  --------------------------------------------------------  IN:    FentaNYL: 6.4 mL    IV PiggyBack: 499.8 mL    IV PiggyBack: 100 mL    multiple electrolytes Injection Type 1.: 900 mL    Propofol: 15.2 mL  Total IN: 1521.4 mL    OUT:    Voided (mL): 650 mL  Total OUT: 650 mL    Total NET: 871.4 mL    MEDICATIONS  (STANDING):  chlorhexidine 0.12% Liquid 15 milliLiter(s) Swish and Spit every 12 hours  chlorhexidine 2% Cloths 1 Application(s) Topical daily  enoxaparin Injectable 40 milliGRAM(s) SubCutaneous two times a day  folic acid 1 milliGRAM(s) Oral daily  insulin lispro (ADMELOG) corrective regimen sliding scale   SubCutaneous every 6 hours  multiple electrolytes Injection Type 1 1000 milliLiter(s) (100 mL/Hr) IV Continuous <Continuous>  multivitamin 1 Tablet(s) Oral daily  pantoprazole  Injectable 40 milliGRAM(s) IV Push daily  thiamine IVPB 500 milliGRAM(s) IV Intermittent every 8 hours    MEDICATIONS  (PRN):  acetaminophen   Tablet .. 975 milliGRAM(s) Oral every 6 hours PRN Temp greater or equal to 38.5C (101.3F), Mild Pain (1 - 3)      LABS:                        11.4   12.65 )-----------( 213      ( 27 Sep 2021 04:32 )             32.8     09-27    141  |  108<H>  |  17.2  ----------------------------<  104<H>  3.6   |  20.0<L>  |  1.17    Ca    8.6      27 Sep 2021 04:32  Phos  1.8     09-27  Mg     2.3     09-27    TPro  8.5  /  Alb  5.2  /  TBili  <0.2<L>  /  DBili  x   /  AST  75<H>  /  ALT  45<H>  /  AlkPhos  104  09-26    PT/INR - ( 26 Sep 2021 05:37 )   PT: 11.5 sec;   INR: 0.99 ratio         PTT - ( 26 Sep 2021 05:37 )  PTT:27.7 sec      RADIOLOGY & ADDITIONAL STUDIES: SICU Downgrade Note    SUBJECTIVE:  Patient seen and examined at bedside. No acute complaints. Patient made NPO p MN for ORIF on Tuesday 9/28.     Vital Signs Last 24 Hrs  T(C): 36.6 (27 Sep 2021 18:21), Max: 38.2 (26 Sep 2021 20:00)  T(F): 97.9 (27 Sep 2021 18:21), Max: 100.8 (26 Sep 2021 20:00)  HR: 71 (27 Sep 2021 18:21) (53 - 107)  BP: 127/89 (27 Sep 2021 18:21) (116/70 - 158/87)  BP(mean): 92 (27 Sep 2021 16:00) (84 - 107)  RR: 18 (27 Sep 2021 18:21) (12 - 22)  SpO2: 95% (27 Sep 2021 18:21) (95% - 100%)    I&O's Summary    26 Sep 2021 07:01  -  27 Sep 2021 07:00  --------------------------------------------------------  IN: 3670 mL / OUT: 3085 mL / NET: 585 mL    27 Sep 2021 07:01  -  27 Sep 2021 18:54  --------------------------------------------------------  IN: 1521.4 mL / OUT: 650 mL / NET: 871.4 mL      PHYSICAL EXAM  General: In no acute distress  HEENT: Head laceration with staples, site c/d/i  Respiratory: Nonlabored on RA  CV: RRR  Abdomen: Soft, non-tender, non-distended  MSK: Soft, no edema    I&O's Detail    26 Sep 2021 07:01  -  27 Sep 2021 07:00  --------------------------------------------------------  IN:    FentaNYL: 73.6 mL    IV PiggyBack: 50 mL    IV PiggyBack: 100 mL    IV PiggyBack: 200 mL    multiple electrolytes Injection Type 1.: 2900 mL    Propofol: 346.4 mL  Total IN: 3670 mL    OUT:    Nasogastric/Oral tube (mL): 400 mL    Voided (mL): 2685 mL  Total OUT: 3085 mL    Total NET: 585 mL      27 Sep 2021 07:01  -  27 Sep 2021 18:54  --------------------------------------------------------  IN:    FentaNYL: 6.4 mL    IV PiggyBack: 499.8 mL    IV PiggyBack: 100 mL    multiple electrolytes Injection Type 1.: 900 mL    Propofol: 15.2 mL  Total IN: 1521.4 mL    OUT:    Voided (mL): 650 mL  Total OUT: 650 mL    Total NET: 871.4 mL    MEDICATIONS  (STANDING):  chlorhexidine 0.12% Liquid 15 milliLiter(s) Swish and Spit every 12 hours  chlorhexidine 2% Cloths 1 Application(s) Topical daily  enoxaparin Injectable 40 milliGRAM(s) SubCutaneous two times a day  folic acid 1 milliGRAM(s) Oral daily  insulin lispro (ADMELOG) corrective regimen sliding scale   SubCutaneous every 6 hours  multiple electrolytes Injection Type 1 1000 milliLiter(s) (100 mL/Hr) IV Continuous <Continuous>  multivitamin 1 Tablet(s) Oral daily  pantoprazole  Injectable 40 milliGRAM(s) IV Push daily  thiamine IVPB 500 milliGRAM(s) IV Intermittent every 8 hours    MEDICATIONS  (PRN):  acetaminophen   Tablet .. 975 milliGRAM(s) Oral every 6 hours PRN Temp greater or equal to 38.5C (101.3F), Mild Pain (1 - 3)      LABS:                        11.4   12.65 )-----------( 213      ( 27 Sep 2021 04:32 )             32.8     09-27    141  |  108<H>  |  17.2  ----------------------------<  104<H>  3.6   |  20.0<L>  |  1.17    Ca    8.6      27 Sep 2021 04:32  Phos  1.8     09-27  Mg     2.3     09-27    TPro  8.5  /  Alb  5.2  /  TBili  <0.2<L>  /  DBili  x   /  AST  75<H>  /  ALT  45<H>  /  AlkPhos  104  09-26    PT/INR - ( 26 Sep 2021 05:37 )   PT: 11.5 sec;   INR: 0.99 ratio         PTT - ( 26 Sep 2021 05:37 )  PTT:27.7 sec      RADIOLOGY & ADDITIONAL STUDIES:
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
## 2021-09-27 NOTE — CONSULT NOTE ADULT - ASSESSMENT
[place] is a 40 year old male presenting with L orbital, ZMX, and mandibular fx    - Discussed fractures with pt at length. Fractures are displaced, and surgical intervention is warranted. Discussed surgical procedure, steps, alternatives and postoperative care at length. Pt would like to proceed with surgical intervention.  - ORIF of multiple L sided facial fracture will be done tomorrow PM by Dr. Renteria, or Dr. Humphries  - NPO after midnight. COVID negative.  - Begin Peridex QID x 10 days  - Postop, begin Abx, medrol dose rosemarie, and Peridex QID.  - please call with any questions or concerns [phone]

## 2021-09-27 NOTE — PROGRESS NOTE ADULT - ATTENDING COMMENTS
Patient seen and examined on am rounds. Sedated turned off, pt extubated. Stable for tx out of ICU, awaiting plastics recs
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
## 2021-09-28 LAB
ANION GAP SERPL CALC-SCNC: 11 MMOL/L — SIGNIFICANT CHANGE UP (ref 5–17)
APTT BLD: 26.5 SEC — LOW (ref 27.5–35.5)
BASOPHILS # BLD AUTO: 0.03 K/UL — SIGNIFICANT CHANGE UP (ref 0–0.2)
BASOPHILS NFR BLD AUTO: 0.3 % — SIGNIFICANT CHANGE UP (ref 0–2)
BLD GP AB SCN SERPL QL: SIGNIFICANT CHANGE UP
BUN SERPL-MCNC: 10.1 MG/DL — SIGNIFICANT CHANGE UP (ref 8–20)
CALCIUM SERPL-MCNC: 9.1 MG/DL — SIGNIFICANT CHANGE UP (ref 8.6–10.2)
CHLORIDE SERPL-SCNC: 105 MMOL/L — SIGNIFICANT CHANGE UP (ref 98–107)
CO2 SERPL-SCNC: 23 MMOL/L — SIGNIFICANT CHANGE UP (ref 22–29)
CREAT SERPL-MCNC: 0.9 MG/DL — SIGNIFICANT CHANGE UP (ref 0.5–1.3)
EOSINOPHIL # BLD AUTO: 0.13 K/UL — SIGNIFICANT CHANGE UP (ref 0–0.5)
EOSINOPHIL NFR BLD AUTO: 1.3 % — SIGNIFICANT CHANGE UP (ref 0–6)
GLUCOSE SERPL-MCNC: 95 MG/DL — SIGNIFICANT CHANGE UP (ref 70–99)
HCT VFR BLD CALC: 32.5 % — LOW (ref 39–50)
HGB BLD-MCNC: 11 G/DL — LOW (ref 13–17)
IMM GRANULOCYTES NFR BLD AUTO: 0.4 % — SIGNIFICANT CHANGE UP (ref 0–1.5)
INR BLD: 1.07 RATIO — SIGNIFICANT CHANGE UP (ref 0.88–1.16)
LYMPHOCYTES # BLD AUTO: 1.92 K/UL — SIGNIFICANT CHANGE UP (ref 1–3.3)
LYMPHOCYTES # BLD AUTO: 19.4 % — SIGNIFICANT CHANGE UP (ref 13–44)
MAGNESIUM SERPL-MCNC: 2.1 MG/DL — SIGNIFICANT CHANGE UP (ref 1.8–2.6)
MCHC RBC-ENTMCNC: 30.4 PG — SIGNIFICANT CHANGE UP (ref 27–34)
MCHC RBC-ENTMCNC: 33.8 GM/DL — SIGNIFICANT CHANGE UP (ref 32–36)
MCV RBC AUTO: 89.8 FL — SIGNIFICANT CHANGE UP (ref 80–100)
MONOCYTES # BLD AUTO: 0.61 K/UL — SIGNIFICANT CHANGE UP (ref 0–0.9)
MONOCYTES NFR BLD AUTO: 6.2 % — SIGNIFICANT CHANGE UP (ref 2–14)
NEUTROPHILS # BLD AUTO: 7.18 K/UL — SIGNIFICANT CHANGE UP (ref 1.8–7.4)
NEUTROPHILS NFR BLD AUTO: 72.4 % — SIGNIFICANT CHANGE UP (ref 43–77)
PHOSPHATE SERPL-MCNC: 2.6 MG/DL — SIGNIFICANT CHANGE UP (ref 2.4–4.7)
PLATELET # BLD AUTO: 198 K/UL — SIGNIFICANT CHANGE UP (ref 150–400)
POTASSIUM SERPL-MCNC: 3.5 MMOL/L — SIGNIFICANT CHANGE UP (ref 3.5–5.3)
POTASSIUM SERPL-SCNC: 3.5 MMOL/L — SIGNIFICANT CHANGE UP (ref 3.5–5.3)
PROTHROM AB SERPL-ACNC: 12.4 SEC — SIGNIFICANT CHANGE UP (ref 10.6–13.6)
RBC # BLD: 3.62 M/UL — LOW (ref 4.2–5.8)
RBC # FLD: 13 % — SIGNIFICANT CHANGE UP (ref 10.3–14.5)
SARS-COV-2 RNA SPEC QL NAA+PROBE: SIGNIFICANT CHANGE UP
SODIUM SERPL-SCNC: 139 MMOL/L — SIGNIFICANT CHANGE UP (ref 135–145)
WBC # BLD: 9.91 K/UL — SIGNIFICANT CHANGE UP (ref 3.8–10.5)
WBC # FLD AUTO: 9.91 K/UL — SIGNIFICANT CHANGE UP (ref 3.8–10.5)

## 2021-09-28 PROCEDURE — 99232 SBSQ HOSP IP/OBS MODERATE 35: CPT

## 2021-09-28 RX ORDER — OXYCODONE HYDROCHLORIDE 5 MG/1
10 TABLET ORAL EVERY 4 HOURS
Refills: 0 | Status: DISCONTINUED | OUTPATIENT
Start: 2021-09-28 | End: 2021-09-29

## 2021-09-28 RX ORDER — OXYCODONE HYDROCHLORIDE 5 MG/1
10 TABLET ORAL EVERY 4 HOURS
Refills: 0 | Status: DISCONTINUED | OUTPATIENT
Start: 2021-09-28 | End: 2021-09-28

## 2021-09-28 RX ORDER — CEFAZOLIN SODIUM 1 G
2000 VIAL (EA) INJECTION EVERY 8 HOURS
Refills: 0 | Status: DISCONTINUED | OUTPATIENT
Start: 2021-09-28 | End: 2021-09-29

## 2021-09-28 RX ORDER — INSULIN LISPRO 100/ML
VIAL (ML) SUBCUTANEOUS EVERY 4 HOURS
Refills: 0 | Status: DISCONTINUED | OUTPATIENT
Start: 2021-09-28 | End: 2021-09-28

## 2021-09-28 RX ORDER — KETOROLAC TROMETHAMINE 30 MG/ML
30 SYRINGE (ML) INJECTION ONCE
Refills: 0 | Status: DISCONTINUED | OUTPATIENT
Start: 2021-09-28 | End: 2021-09-28

## 2021-09-28 RX ORDER — HYDROMORPHONE HYDROCHLORIDE 2 MG/ML
0.5 INJECTION INTRAMUSCULAR; INTRAVENOUS; SUBCUTANEOUS
Refills: 0 | Status: DISCONTINUED | OUTPATIENT
Start: 2021-09-28 | End: 2021-09-28

## 2021-09-28 RX ORDER — ACETAMINOPHEN 500 MG
975 TABLET ORAL EVERY 6 HOURS
Refills: 0 | Status: DISCONTINUED | OUTPATIENT
Start: 2021-09-28 | End: 2021-09-29

## 2021-09-28 RX ORDER — SODIUM CHLORIDE 9 MG/ML
1000 INJECTION, SOLUTION INTRAVENOUS
Refills: 0 | Status: DISCONTINUED | OUTPATIENT
Start: 2021-09-28 | End: 2021-09-28

## 2021-09-28 RX ORDER — OXYCODONE HYDROCHLORIDE 5 MG/1
5 TABLET ORAL EVERY 4 HOURS
Refills: 0 | Status: DISCONTINUED | OUTPATIENT
Start: 2021-09-28 | End: 2021-09-28

## 2021-09-28 RX ORDER — DEXTROSE 50 % IN WATER 50 %
15 SYRINGE (ML) INTRAVENOUS ONCE
Refills: 0 | Status: DISCONTINUED | OUTPATIENT
Start: 2021-09-28 | End: 2021-09-28

## 2021-09-28 RX ORDER — ONDANSETRON 8 MG/1
4 TABLET, FILM COATED ORAL ONCE
Refills: 0 | Status: DISCONTINUED | OUTPATIENT
Start: 2021-09-28 | End: 2021-09-28

## 2021-09-28 RX ORDER — FOLIC ACID 0.8 MG
1 TABLET ORAL DAILY
Refills: 0 | Status: DISCONTINUED | OUTPATIENT
Start: 2021-09-28 | End: 2021-09-29

## 2021-09-28 RX ORDER — CHLORHEXIDINE GLUCONATE 213 G/1000ML
15 SOLUTION TOPICAL EVERY 12 HOURS
Refills: 0 | Status: DISCONTINUED | OUTPATIENT
Start: 2021-09-28 | End: 2021-09-29

## 2021-09-28 RX ORDER — OXYCODONE HYDROCHLORIDE 5 MG/1
5 TABLET ORAL EVERY 4 HOURS
Refills: 0 | Status: DISCONTINUED | OUTPATIENT
Start: 2021-09-28 | End: 2021-09-29

## 2021-09-28 RX ORDER — DEXTROSE 50 % IN WATER 50 %
25 SYRINGE (ML) INTRAVENOUS ONCE
Refills: 0 | Status: DISCONTINUED | OUTPATIENT
Start: 2021-09-28 | End: 2021-09-28

## 2021-09-28 RX ORDER — ENOXAPARIN SODIUM 100 MG/ML
30 INJECTION SUBCUTANEOUS
Refills: 0 | Status: DISCONTINUED | OUTPATIENT
Start: 2021-09-28 | End: 2021-09-29

## 2021-09-28 RX ORDER — PANTOPRAZOLE SODIUM 20 MG/1
40 TABLET, DELAYED RELEASE ORAL DAILY
Refills: 0 | Status: DISCONTINUED | OUTPATIENT
Start: 2021-09-28 | End: 2021-09-29

## 2021-09-28 RX ORDER — ENOXAPARIN SODIUM 100 MG/ML
40 INJECTION SUBCUTANEOUS
Refills: 0 | Status: DISCONTINUED | OUTPATIENT
Start: 2021-09-28 | End: 2021-09-28

## 2021-09-28 RX ADMIN — CHLORHEXIDINE GLUCONATE 15 MILLILITER(S): 213 SOLUTION TOPICAL at 05:59

## 2021-09-28 RX ADMIN — Medication 975 MILLIGRAM(S): at 00:04

## 2021-09-28 RX ADMIN — Medication 975 MILLIGRAM(S): at 18:29

## 2021-09-28 RX ADMIN — SODIUM CHLORIDE 100 MILLILITER(S): 9 INJECTION, SOLUTION INTRAVENOUS at 05:59

## 2021-09-28 RX ADMIN — Medication 105 MILLIGRAM(S): at 05:59

## 2021-09-28 RX ADMIN — CHLORHEXIDINE GLUCONATE 15 MILLILITER(S): 213 SOLUTION TOPICAL at 18:29

## 2021-09-28 RX ADMIN — Medication 100 MILLIGRAM(S): at 22:44

## 2021-09-28 RX ADMIN — ENOXAPARIN SODIUM 30 MILLIGRAM(S): 100 INJECTION SUBCUTANEOUS at 18:30

## 2021-09-28 RX ADMIN — Medication 24 MILLIGRAM(S): at 22:43

## 2021-09-28 RX ADMIN — Medication 975 MILLIGRAM(S): at 00:00

## 2021-09-28 NOTE — BRIEF OPERATIVE NOTE - OPERATION/FINDINGS
Left ZMC fracture and left mandibular condyle fracture. Left ZMC fracture trereated with plates and screws,  condylar fracture treated with closed reduction and MMF
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
## 2021-09-28 NOTE — CHART NOTE - NSCHARTNOTEFT_GEN_A_CORE
Post-op Check    Subjective:  Pt offers no acute complaints at this time. Pain well controlled on current regiment. Denies chest pain, SOB, palpitations. Patient facial swelling stable, wire cutters at bedside, on medrol dose rosemarie, peridex and abx.    STATUS POST:  Left ZMC fracture and left mandibular condyle fracture. Left ZMC fracture trereated with plates and screws,  condylar fracture treated with closed reduction and MMF    POST OPERATIVE DAY #: 0    MEDICATIONS  (STANDING):  acetaminophen   Tablet .. 975 milliGRAM(s) Oral every 6 hours  ceFAZolin   IVPB 2000 milliGRAM(s) IV Intermittent every 8 hours  chlorhexidine 0.12% Liquid 15 milliLiter(s) Oral Mucosa every 12 hours  enoxaparin Injectable 30 milliGRAM(s) SubCutaneous two times a day  folic acid 1 milliGRAM(s) Oral daily  multivitamin 1 Tablet(s) Oral daily  pantoprazole  Injectable 40 milliGRAM(s) IV Push daily    MEDICATIONS  (PRN):  oxyCODONE    Solution 5 milliGRAM(s) Oral every 4 hours PRN Moderate Pain (4 - 6)  oxyCODONE    Solution 10 milliGRAM(s) Oral every 4 hours PRN Severe Pain (7 - 10)      Vital Signs Last 24 Hrs  T(C): 37 (28 Sep 2021 15:30), Max: 37.2 (28 Sep 2021 11:06)  T(F): 98.6 (28 Sep 2021 15:30), Max: 99 (28 Sep 2021 11:06)  HR: 67 (28 Sep 2021 16:09) (61 - 99)  BP: 141/79 (28 Sep 2021 15:37) (119/75 - 154/92)  BP(mean): 93 (28 Sep 2021 15:37) (93 - 106)  RR: 16 (28 Sep 2021 16:09) (14 - 22)  SpO2: 100% (28 Sep 2021 16:09) (94% - 100%)    Physical Exam:    Constitutional: NAD  HEENT: facial swelling stable resolving, multiple scattered abrasions/ lacerations stable, sutures well seated   Neck: No JVD, FROM without pain  Respiratory: no accessory muscle use, respirations non-labored  GI: abdomen soft, non-tender, atraumatic   Neurological: A&O x 3; without gross deficit    A:     P:  Continue current care  wire cutters at bedside  abx  medrol dose rosemarie  Pain control  ice pack   OOB as tolerated  Encourage IS  DVT ppx

## 2021-09-29 VITALS
HEART RATE: 51 BPM | RESPIRATION RATE: 18 BRPM | TEMPERATURE: 99 F | DIASTOLIC BLOOD PRESSURE: 89 MMHG | SYSTOLIC BLOOD PRESSURE: 136 MMHG | OXYGEN SATURATION: 94 %

## 2021-09-29 PROCEDURE — 85025 COMPLETE CBC W/AUTO DIFF WBC: CPT

## 2021-09-29 PROCEDURE — C1889: CPT

## 2021-09-29 PROCEDURE — 82962 GLUCOSE BLOOD TEST: CPT

## 2021-09-29 PROCEDURE — 82435 ASSAY OF BLOOD CHLORIDE: CPT

## 2021-09-29 PROCEDURE — 80307 DRUG TEST PRSMV CHEM ANLYZR: CPT

## 2021-09-29 PROCEDURE — 84132 ASSAY OF SERUM POTASSIUM: CPT

## 2021-09-29 PROCEDURE — C9399: CPT

## 2021-09-29 PROCEDURE — 85610 PROTHROMBIN TIME: CPT

## 2021-09-29 PROCEDURE — 96375 TX/PRO/DX INJ NEW DRUG ADDON: CPT

## 2021-09-29 PROCEDURE — 86900 BLOOD TYPING SEROLOGIC ABO: CPT

## 2021-09-29 PROCEDURE — 71260 CT THORAX DX C+: CPT | Mod: MA

## 2021-09-29 PROCEDURE — 71045 X-RAY EXAM CHEST 1 VIEW: CPT

## 2021-09-29 PROCEDURE — 86901 BLOOD TYPING SEROLOGIC RH(D): CPT

## 2021-09-29 PROCEDURE — 85014 HEMATOCRIT: CPT

## 2021-09-29 PROCEDURE — 82947 ASSAY GLUCOSE BLOOD QUANT: CPT

## 2021-09-29 PROCEDURE — C1713: CPT

## 2021-09-29 PROCEDURE — 85027 COMPLETE CBC AUTOMATED: CPT

## 2021-09-29 PROCEDURE — 74177 CT ABD & PELVIS W/CONTRAST: CPT | Mod: MA

## 2021-09-29 PROCEDURE — 84484 ASSAY OF TROPONIN QUANT: CPT

## 2021-09-29 PROCEDURE — 99232 SBSQ HOSP IP/OBS MODERATE 35: CPT

## 2021-09-29 PROCEDURE — U0003: CPT

## 2021-09-29 PROCEDURE — 93005 ELECTROCARDIOGRAM TRACING: CPT

## 2021-09-29 PROCEDURE — 70450 CT HEAD/BRAIN W/O DYE: CPT | Mod: MA

## 2021-09-29 PROCEDURE — 80048 BASIC METABOLIC PNL TOTAL CA: CPT

## 2021-09-29 PROCEDURE — 72125 CT NECK SPINE W/O DYE: CPT | Mod: MA

## 2021-09-29 PROCEDURE — 84100 ASSAY OF PHOSPHORUS: CPT

## 2021-09-29 PROCEDURE — 85018 HEMOGLOBIN: CPT

## 2021-09-29 PROCEDURE — 82803 BLOOD GASES ANY COMBINATION: CPT

## 2021-09-29 PROCEDURE — 94003 VENT MGMT INPAT SUBQ DAY: CPT

## 2021-09-29 PROCEDURE — 82553 CREATINE MB FRACTION: CPT

## 2021-09-29 PROCEDURE — 85730 THROMBOPLASTIN TIME PARTIAL: CPT

## 2021-09-29 PROCEDURE — 36415 COLL VENOUS BLD VENIPUNCTURE: CPT

## 2021-09-29 PROCEDURE — 86769 SARS-COV-2 COVID-19 ANTIBODY: CPT

## 2021-09-29 PROCEDURE — 82330 ASSAY OF CALCIUM: CPT

## 2021-09-29 PROCEDURE — 86850 RBC ANTIBODY SCREEN: CPT

## 2021-09-29 PROCEDURE — 94002 VENT MGMT INPAT INIT DAY: CPT

## 2021-09-29 PROCEDURE — U0005: CPT

## 2021-09-29 PROCEDURE — 90714 TD VACC NO PRESV 7 YRS+ IM: CPT

## 2021-09-29 PROCEDURE — 99285 EMERGENCY DEPT VISIT HI MDM: CPT

## 2021-09-29 PROCEDURE — 83735 ASSAY OF MAGNESIUM: CPT

## 2021-09-29 PROCEDURE — 83605 ASSAY OF LACTIC ACID: CPT

## 2021-09-29 PROCEDURE — 80053 COMPREHEN METABOLIC PANEL: CPT

## 2021-09-29 PROCEDURE — 94760 N-INVAS EAR/PLS OXIMETRY 1: CPT

## 2021-09-29 PROCEDURE — 82550 ASSAY OF CK (CPK): CPT

## 2021-09-29 PROCEDURE — 84295 ASSAY OF SERUM SODIUM: CPT

## 2021-09-29 PROCEDURE — 70486 CT MAXILLOFACIAL W/O DYE: CPT | Mod: MA

## 2021-09-29 PROCEDURE — 83036 HEMOGLOBIN GLYCOSYLATED A1C: CPT

## 2021-09-29 PROCEDURE — 96374 THER/PROPH/DIAG INJ IV PUSH: CPT

## 2021-09-29 RX ORDER — ACETAMINOPHEN 500 MG
975 TABLET ORAL EVERY 6 HOURS
Refills: 0 | Status: DISCONTINUED | OUTPATIENT
Start: 2021-09-29 | End: 2021-09-29

## 2021-09-29 RX ORDER — ACETAMINOPHEN 500 MG
30.47 TABLET ORAL
Qty: 0 | Refills: 0 | DISCHARGE
Start: 2021-09-29

## 2021-09-29 RX ORDER — OXYCODONE HYDROCHLORIDE 5 MG/1
5 TABLET ORAL
Qty: 150 | Refills: 0
Start: 2021-09-29 | End: 2021-10-03

## 2021-09-29 RX ORDER — IBUPROFEN 200 MG
20 TABLET ORAL
Qty: 0 | Refills: 0 | DISCHARGE
Start: 2021-09-29

## 2021-09-29 RX ORDER — IBUPROFEN 200 MG
400 TABLET ORAL EVERY 6 HOURS
Refills: 0 | Status: DISCONTINUED | OUTPATIENT
Start: 2021-09-29 | End: 2021-09-29

## 2021-09-29 RX ORDER — OXYCODONE HYDROCHLORIDE 5 MG/1
5 TABLET ORAL
Qty: 0 | Refills: 0 | DISCHARGE
Start: 2021-09-29

## 2021-09-29 RX ORDER — CHLORHEXIDINE GLUCONATE 213 G/1000ML
15 SOLUTION TOPICAL
Qty: 0 | Refills: 0 | DISCHARGE
Start: 2021-09-29

## 2021-09-29 RX ORDER — CHLORHEXIDINE GLUCONATE 213 G/1000ML
15 SOLUTION TOPICAL
Qty: 210 | Refills: 0
Start: 2021-09-29 | End: 2021-10-05

## 2021-09-29 RX ADMIN — Medication 1 TABLET(S): at 13:34

## 2021-09-29 RX ADMIN — Medication 1 MILLIGRAM(S): at 13:34

## 2021-09-29 RX ADMIN — Medication 4 MILLIGRAM(S): at 13:33

## 2021-09-29 RX ADMIN — PANTOPRAZOLE SODIUM 40 MILLIGRAM(S): 20 TABLET, DELAYED RELEASE ORAL at 13:33

## 2021-09-29 RX ADMIN — Medication 4 MILLIGRAM(S): at 06:01

## 2021-09-29 RX ADMIN — Medication 975 MILLIGRAM(S): at 13:36

## 2021-09-29 RX ADMIN — Medication 100 MILLIGRAM(S): at 05:53

## 2021-09-29 NOTE — ED PROCEDURE NOTE - PROCEDURE ADDITIONAL DETAILS
Tracheal intubation with glidescope. Blood noted in the airway requiring suction. 7.5 ETT secured at 24cm at the lip. Position confirmed with end tidal CO2. +chest excursion, and chest xray. Meds: etomidate, succinylcholine, and rocuronium

## 2021-09-29 NOTE — DISCHARGE NOTE PROVIDER - NSDCMRMEDTOKEN_GEN_ALL_CORE_FT
acetaminophen 160 mg/5 mL oral suspension: 30.47 milliliter(s) orally every 6 hours  chlorhexidine 0.12% mucous membrane liquid: 15 milliliter(s) mucous membrane every 12 hours  ibuprofen 100 mg/5 mL oral suspension: 20 milliliter(s) orally every 6 hours   acetaminophen 160 mg/5 mL oral suspension: 30.47 milliliter(s) orally every 6 hours  chlorhexidine 0.12% mucous membrane liquid: 15 milliliter(s) mucous membrane every 12 hours  ibuprofen 100 mg/5 mL oral suspension: 20 milliliter(s) orally every 6 hours  oxyCODONE 5 mg/5 mL oral solution: 5 milliliter(s) orally every 4 hours, As needed, Moderate Pain (4 - 6)

## 2021-09-29 NOTE — CHART NOTE - NSCHARTNOTEFT_GEN_A_CORE
Nutrition Note: Aware pt downgraded from ICU. Chart reviewed; RD to follow up with full nutrition assessment per medical floor protocol.
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
## 2021-09-29 NOTE — DISCHARGE NOTE NURSING/CASE MANAGEMENT/SOCIAL WORK - PATIENT PORTAL LINK FT
You can access the FollowMyHealth Patient Portal offered by [facility] by registering at the following website: [email or web address]. By joining [employer]’s FollowMyHealth portal, you will also be able to view your health information using other applications (apps) compatible with our system.
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
## 2021-09-29 NOTE — DISCHARGE NOTE PROVIDER - NSFOLLOWUPCLINICS_GEN_ALL_ED_FT
[facility] Acute Care Surgery  Acute Care Surgery  [address] [place] [id number]  Phone: [phone]  Fax:   Follow Up Time: 1-3 days

## 2021-09-29 NOTE — PROGRESS NOTE ADULT - SUBJECTIVE AND OBJECTIVE BOX
INTERVAL HPI/OVERNIGHT EVENTS:    No acute events overnight.     MEDICATIONS  (STANDING):  acetaminophen   Tablet .. 975 milliGRAM(s) Oral every 6 hours  ceFAZolin   IVPB 2000 milliGRAM(s) IV Intermittent every 8 hours  chlorhexidine 0.12% Liquid 15 milliLiter(s) Oral Mucosa every 12 hours  enoxaparin Injectable 30 milliGRAM(s) SubCutaneous two times a day  folic acid 1 milliGRAM(s) Oral daily  methylPREDNISolone   Oral   methylPREDNISolone 4 milliGRAM(s) Oral before breakfast  methylPREDNISolone 4 milliGRAM(s) Oral after lunch  methylPREDNISolone 4 milliGRAM(s) Oral after dinner  methylPREDNISolone 8 milliGRAM(s) Oral at bedtime  multivitamin 1 Tablet(s) Oral daily  pantoprazole  Injectable 40 milliGRAM(s) IV Push daily    MEDICATIONS  (PRN):  oxyCODONE    Solution 5 milliGRAM(s) Oral every 4 hours PRN Moderate Pain (4 - 6)  oxyCODONE    Solution 10 milliGRAM(s) Oral every 4 hours PRN Severe Pain (7 - 10)      Vital Signs Last 24 Hrs  T(C): 37.2 (28 Sep 2021 20:05), Max: 37.2 (28 Sep 2021 11:06)  T(F): 98.9 (28 Sep 2021 20:05), Max: 99 (28 Sep 2021 11:06)  HR: 76 (28 Sep 2021 20:05) (61 - 99)  BP: 134/84 (29 Sep 2021 00:47) (131/82 - 154/92)  BP(mean): 93 (28 Sep 2021 15:37) (93 - 106)  RR: 18 (29 Sep 2021 00:47) (14 - 22)  SpO2: 96% (29 Sep 2021 00:47) (95% - 100%)    Constitutional: NAD  HEENT: facial swelling stable resolving, multiple scattered abrasions/ lacerations stable, sutures well seated   Neck: No JVD, FROM without pain  Respiratory: no accessory muscle use, respirations non-labored  GI: abdomen soft, non-tender, atraumatic   Neurological: A&O x 3; without gross deficit    I&O's Detail    27 Sep 2021 07:01  -  28 Sep 2021 07:00  --------------------------------------------------------  IN:    FentaNYL: 6.4 mL    IV PiggyBack: 499.8 mL    IV PiggyBack: 100 mL    multiple electrolytes Injection Type 1.: 900 mL    Propofol: 15.2 mL  Total IN: 1521.4 mL    OUT:    Voided (mL): 650 mL  Total OUT: 650 mL    Total NET: 871.4 mL          LABS:                        11.0   9.91  )-----------( 198      ( 28 Sep 2021 08:43 )             32.5     09-28    139  |  105  |  10.1  ----------------------------<  95  3.5   |  23.0  |  0.90    Ca    9.1      28 Sep 2021 08:43  Phos  2.6     09-28  Mg     2.1     09-28      PT/INR - ( 28 Sep 2021 08:43 )   PT: 12.4 sec;   INR: 1.07 ratio         PTT - ( 28 Sep 2021 08:43 )  PTT:26.5 sec      RADIOLOGY & ADDITIONAL STUDIES:

## 2021-09-29 NOTE — PROGRESS NOTE ADULT - ASSESSMENT
POD1 s/p L ORIF of L ZMC, closed reduction and MMF for mandibular condyle fx    -wirecutters at bedside  -continue abx  -continue peridex rinses  -continue liquid diet

## 2021-09-29 NOTE — DISCHARGE NOTE NURSING/CASE MANAGEMENT/SOCIAL WORK - NSDCVIVACCINE_GEN_ALL_CORE_FT
Td (adult) preservative free; 26-Sep-2021 05:40; Adelina Ceja (LEW); [employer]; [id number] (Exp. Date: 15-Jul-2023); IntraMuscular; Deltoid Left.; 0.5 milliLiter(s); VIS (VIS Published: 12-Sep-2021, VIS Presented: 26-Sep-2021);

## 2021-10-01 ENCOUNTER — EMERGENCY (EMERGENCY)
Facility: HOSPITAL | Age: 26
LOS: 1 days | Discharge: HOME | End: 2021-10-01
Attending: EMERGENCY MEDICINE
Payer: COMMERCIAL

## 2021-10-01 VITALS
RESPIRATION RATE: 20 BRPM | OXYGEN SATURATION: 99 % | WEIGHT: 139.99 LBS | HEIGHT: 68 IN | TEMPERATURE: 99 F | SYSTOLIC BLOOD PRESSURE: 151 MMHG | DIASTOLIC BLOOD PRESSURE: 89 MMHG | HEART RATE: 67 BPM

## 2021-10-01 PROCEDURE — 99285 EMERGENCY DEPT VISIT HI MDM: CPT

## 2021-10-01 PROCEDURE — 99284 EMERGENCY DEPT VISIT MOD MDM: CPT

## 2021-10-01 PROCEDURE — T1013: CPT
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
## 2021-10-01 NOTE — ED STATDOCS - ENMT, MLM
Multiple old healing abrasions to face, laceration to left eyebrow and occipital scalp, well healing. Wire in place to jaw

## 2021-10-01 NOTE — ED STATDOCS - ATTENDING CONTRIBUTION TO CARE
I, Kelvin Ferrari, performed the initial face to face bedside interview with this patient regarding history of present illness, review of symptoms and relevant past medical, social and family history.  I completed an independent physical examination.  I was the initial provider who evaluated this patient. I have signed out the follow up of any pending tests (i.e. labs, radiological studies) to the ACP.  I have communicated the patient’s plan of care and disposition with the ACP.

## 2021-10-01 NOTE — ED STATDOCS - ADDITIONAL NOTES AND INSTRUCTIONS:
PT was evaluated At [facility] ED and was found to have a condition that warranted time of to rest and heal from WORK/SCHOOL.   Pastor Celis PA-C
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
## 2021-10-01 NOTE — ED STATDOCS - PATIENT PORTAL LINK FT
You can access the FollowMyHealth Patient Portal offered by [facility] by registering at the following website: [email or web address]. By joining [employer]’s FollowMyHealth portal, you will also be able to view your health information using other applications (apps) compatible with our system.
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
## 2021-10-01 NOTE — ED STATDOCS - NSFOLLOWUPINSTRUCTIONS_ED_ALL_ED_FT
Cuidado de la ligadura maxilar con alambres    Wired Jaw Care      Pueden ligarle el maxilar con alambres por muchas causas, que incluyen elly fractura o elly cirugía del maxilar. Los alambres ayudan a mantener el maxilar en roberts lugar susan la cicatrización.      Siga estas indicaciones en roberts casa:    Cuidado de la boca     •Hágase enjuagues bucales con elly mezcla de agua tibia con sal después de comer o beber. Para preparar la mezcla de agua con gil, mezcle media cucharadita de sal en 1 taza de agua tibia.      •Si le recetaron un enjuague bucal con receta, úselo khadra se lo haya indicado el médico.      •Cepíllese la geneva delantera de los dientes con un cepillo dental suave para niños después de comer.      •Si tiene que vomitar, inclínese y separe los labios. Después de vomitar, siempre enjuáguese la boca y lávese los dientes.      Control del dolor y de la hinchazón     •Siga las indicaciones del médico en cuanto a la forma de disminuir la hinchazón.    •Si se lo indican, aplique hielo sobre la esther afectada.  •Ponga el hielo en elly bolsa plástica.      •Coloque elly toalla entre la piel y la bolsa de hielo.      •Coloque el hielo susan 20 minutos, 2 a 3 veces por día.        •Para ayudar a reducir la hinchazón, siéntese o colóquese almohadas detrás de la espalda para apoyarse.      •[place] los medicamentos de venta anna y los recetados solamente khadra se lo haya indicado el médico.      •Aplíquese vaselina en los labios para evitar que se sequen y se agrieten.      •Recubra los alambres con cera dental si se le clava alguno en los labios o las encías.      Actividad     • No conduzca ni use maquinaria pesada mientras festus analgésicos recetados.      • No practique deportes hasta que el médico lo autorice.      Seguridad     •Lleve siempre con usted alicates para cortar alambres. Solo en jonathan de emergencia, úselos para cortar los alambres que mantienen unidos los maxilares.    •Puede cortar los alambres que mantienen unidos a los maxilares solo en los siguientes casos:  •Si tiene dificultad para respirar.      •Si se está ahogando.        •Si debe cortar los alambres en un jonathan de emergencia, ashlie en forma recta los que le mantienen roberts boca cerrada. Estos son los alambres que están conectados con los alambres que se unen a los molares (alambres de ortodoncia). No ashlie los alambres de ortodoncia.        Qué debe comer y beber      •Siga las indicaciones del médico respecto de lo que puede y no puede comer.      •Deberá seguir elly dieta líquida. Licue todos los alimentos para que puedan beberse con un sorbete o administrarse con elly jeringa. Evite las nueces, las semillas, las cáscaras, los hollejos, los huesos o cualquier otro alimento que no pueda licuarse para lograr la consistencia adecuada.      •Si tiene los dientes o la boca sensibles a las temperaturas extremas, caliente o enfríe los alimentos hasta lograr elly temperatura templada.      Instrucciones generales   • No ashlie los alambres:  •Aunque tenga que vomitar.      •Aunque tenga apetito.      •Aunque esté cansado de la ligadura maxilar.      •Si festus analgésicos recetados, adopte medidas para prevenir o tratar el estreñimiento.  •[place] un medicamento recetado o de venta anna para el estreñimiento.      •Tania suficiente líquido khadra para mantener la orina de color amarillo pálido.        •Concurra a todas las visitas de seguimiento khadra se lo haya indicado el médico. [place] es importante.        Comuníquese con un médico si:    •Tiene fiebre.      •Siente náuseas o vomita.      •Siente que mee o más alambres se das cortado.      •Observa líquido, carline o pus que emana de la boca o las incisiones.      •Tiene mareos.        Solicite ayuda de inmediato si:    •Debe cortar los alambres que mantienen unidos los maxilares.      •El dolor es intenso y no se jesus con los medicamentos.      •Se desmaya.        Resumen    •Pueden ligarle el maxilar con alambres después de elly fractura o elly cirugía del maxilar. Los alambres ayudan a mantener el maxilar en roberts lugar susan la cicatrización.      •Hágase enjuagues bucales con elly mezcla de agua tibia con sal después de comer o beber. Use un enjuague bucal con receta si roberts médico se lo indica.      •Siga las indicaciones de roberts médico sobre cómo ayudar a reducir la hinchazón, cómo manejar el dolor, y qué debe comer y qué no.      •Lleve siempre con usted alicates para cortar alambres. Solo en jonathan de emergencia, úselos para cortar los alambres que mantienen unidos los maxilares. Ashlie en forma recta los alambres que mantienen roberts boca cerrada. No ashlie los alambres que se unen a los molares (alambre de ortodoncia).      Esta información no tiene khadra fin reemplazar el consejo del médico. Asegúrese de hacerle al médico cualquier pregunta que tenga.
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
## 2021-10-01 NOTE — ED STATDOCS - CARE PROVIDER_API CALL
Brayan Renteria)  Plastic Surgery  [address] [place] [address]  Phone: [phone]  Fax: [phone]  Follow Up Time:

## 2021-10-01 NOTE — ED CLERICAL - CLERICAL COMMENTS
Dr. Renteria called requesting return call to dr. reza Dr. Renteria called requesting return call to dr. reza. 2nd call placed to plastics . Dr. Marcano or Edgard to callback

## 2021-10-01 NOTE — ED STATDOCS - PROGRESS NOTE DETAILS
Pastor Hartman: Pt seen by intake physician and HPI/ROS/PE/plan reviewed Confirmed and adjusted were appropriate.    PE: GEN: Awake, alert,  NAD,  EYES: PERRL CARDIAC: Reg rate and rhythm, S1,S2, RRR  RESP: No distress noted. Lungs CTA bilaterally no wheeze, ronchi, rales. ABD: soft,  non-tender, no guarding. . NEURO: AOx3, no focal deficits   PLAN: PT with stable VS, no acute distress, non toxic appearing, tolerating PO in the ED, Pt with no res distress, no resp compromise due to wire, Dr. Lugo from plastics contacted pt was supposed to follow up to office for removal and that pt can be dc home with no intervention and that pt can reschedule with office continue with soft diet, follow up to plastics. educated about when to return to the ED if needed. PT verbalizes that he understands all instructions and results. Pt informed that ED is open and available 24/7 365 days a yr, encouraged to return to the ED if they have any change in condition, or feel the need for revaluation.

## 2021-10-01 NOTE — ED ADULT NURSE NOTE - NSIMPLEMENTINTERV_GEN_ALL_ED
Implemented All Universal Safety Interventions:  [place] to call system. Call bell, personal items and telephone within reach. Instruct patient to call for assistance. Room bathroom lighting operational. Non-slip footwear when patient is off stretcher. Physically safe environment: no spills, clutter or unnecessary equipment. Stretcher in lowest position, wheels locked, appropriate side rails in place.

## 2021-10-18 NOTE — ED PROVIDER NOTE - NS ED MD DISPO ADMITTING SERVICE
How Severe Is Your Skin Lesion?: moderate Have Your Skin Lesions Been Treated?: not been treated Is This A New Presentation, Or A Follow-Up?: Skin Lesions SICU

## 2022-11-02 NOTE — ED STATDOCS - CARDIAC, MLM
normal rate, regular rhythm, and no murmur. Clindamycin Counseling: I counseled the patient regarding use of clindamycin as an antibiotic for prophylactic and/or therapeutic purposes. Clindamycin is active against numerous classes of bacteria, including skin bacteria. Side effects may include nausea, diarrhea, gastrointestinal upset, rash, hives, yeast infections, and in rare cases, colitis.

## 2023-03-27 NOTE — PATIENT PROFILE ADULT - OVER THE PAST TWO WEEKS HAVE YOU FELT DOWN, DEPRESSED OR HOPELESS?
Impression: Age-related nuclear cataract, bilateral: H25.13.  Plan: --cleared for CE/IOL OS at this time
--CE/IOL OD unlikely to improve vision AMIE- patient is intubated & sedated. No patient hx able to be obtained.

## 2024-01-17 NOTE — PROGRESS NOTE ADULT - SUBJECTIVE AND OBJECTIVE BOX
[Good] : ~his/her~  mood as  good SUBJECTIVE/24 hour events:  Patient is a 39y/o M with unknown MedHx who presents to the ED s/p drop-off with injuries to face.  He was carried out of car by ED staff and initially unresponsive while wheeled into [facility] [place].  Immediately patient began to scream and was extremely agitated.  Patient unable to localize and complain of pain secondary to intoxication.  Decision to intubate was made due to inability to assess patient's injuries and fluctuation in neurological status. Patient scanned and found to have grade 2 splenic laceration, multiple facial fractures and rib fractures. Patient downgraded from the SICU overnight with no acute issues, pain controlled on current regimen, npo for or today with plastics         Vital Signs Last 24 Hrs  T(C): 36.8 (28 Sep 2021 00:08), Max: 38 (27 Sep 2021 02:00)  T(F): 98.3 (28 Sep 2021 00:08), Max: 100.4 (27 Sep 2021 02:00)  HR: 61 (28 Sep 2021 00:08) (53 - 107)  BP: 136/81 (28 Sep 2021 00:08) (117/80 - 158/87)  BP(mean): 92 (27 Sep 2021 16:00) (84 - 107)  RR: 18 (28 Sep 2021 00:08) (12 - 22)  SpO2: 96% (28 Sep 2021 00:08) (94% - 100%)  Drug Dosing Weight  Height (cm): 172.7 (26 Sep 2021 07:15)  Weight (kg): 64.6 (26 Sep 2021 07:15)  BMI (kg/m2): 21.7 (26 Sep 2021 07:15)  BSA (m2): 1.77 (26 Sep 2021 07:15)  I&O's Detail    26 Sep 2021 07:01  -  27 Sep 2021 07:00  --------------------------------------------------------  IN:    FentaNYL: 73.6 mL    IV PiggyBack: 50 mL    IV PiggyBack: 100 mL    IV PiggyBack: 200 mL    multiple electrolytes Injection Type 1.: 2900 mL    Propofol: 346.4 mL  Total IN: 3670 mL    OUT:    Nasogastric/Oral tube (mL): 400 mL    Voided (mL): 2685 mL  Total OUT: 3085 mL    Total NET: 585 mL      27 Sep 2021 07:01  -  28 Sep 2021 01:59  --------------------------------------------------------  IN:    FentaNYL: 6.4 mL    IV PiggyBack: 499.8 mL    IV PiggyBack: 100 mL    multiple electrolytes Injection Type 1.: 900 mL    Propofol: 15.2 mL  Total IN: 1521.4 mL    OUT:    Voided (mL): 650 mL  Total OUT: 650 mL    Total NET: 871.4 mL        Allergies    No Known Allergies    Intolerances                              11.4   12.65 )-----------( 213      ( 27 Sep 2021 04:32 )             32.8   09-27    141  |  108<H>  |  17.2  ----------------------------<  104<H>  3.6   |  20.0<L>  |  1.17    Ca    8.6      27 Sep 2021 04:32  Phos  1.8     09-27  Mg     2.3     09-27    TPro  8.5  /  Alb  5.2  /  TBili  <0.2<L>  /  DBili  x   /  AST  75<H>  /  ALT  45<H>  /  AlkPhos  104  09-26  PT/INR - ( 26 Sep 2021 05:37 )   PT: 11.5 sec;   INR: 0.99 ratio         PTT - ( 26 Sep 2021 05:37 )  PTT:27.7 sec    ROS:    PHYSICAL EXAM:  Constitutional:  resting comfortably, wife at bedside   ENMT: multiple bilateral facial lacerations healing , sutures well seated, facial edema resolving   Respiratory: no respiratory distress, no dyspnea, no supplemental o2 needed,   Gastrointestinal: abdomen softly distended, no guarding, no peritonitis   Genitourinary: voiding spontaneously   Neurological: NAD          MEDICATIONS  (STANDING):  acetaminophen   Tablet .. 975 milliGRAM(s) Oral every 6 hours  chlorhexidine 0.12% Liquid 15 milliLiter(s) Swish and Spit every 12 hours  chlorhexidine 2% Cloths 1 Application(s) Topical daily  folic acid 1 milliGRAM(s) Oral daily  multiple electrolytes Injection Type 1 1000 milliLiter(s) (100 mL/Hr) IV Continuous <Continuous>  multivitamin 1 Tablet(s) Oral daily  pantoprazole  Injectable 40 milliGRAM(s) IV Push daily  thiamine IVPB 500 milliGRAM(s) IV Intermittent every 8 hours    MEDICATIONS  (PRN):  oxyCODONE    IR 5 milliGRAM(s) Oral every 4 hours PRN Moderate Pain (4 - 6)  oxyCODONE    IR 10 milliGRAM(s) Oral every 4 hours PRN Severe Pain (7 - 10)      RADIOLOGY STUDIES:    CULTURES:         [No] : No [No falls in past year] : Patient reported no falls in the past year [0] : 2) Feeling down, depressed, or hopeless: Not at all (0) [Patient reported mammogram was normal] : Patient reported mammogram was normal [Patient reported PAP Smear was normal] : Patient reported PAP Smear was normal [Alone] : lives alone [Employed] : employed [Graduate School] : graduate school [Single] : single [Feels Safe at Home] : Feels safe at home [Fully functional (bathing, dressing, toileting, transferring, walking, feeding)] : Fully functional (bathing, dressing, toileting, transferring, walking, feeding) [Fully functional (using the telephone, shopping, preparing meals, housekeeping, doing laundry, using] : Fully functional and needs no help or supervision to perform IADLs (using the telephone, shopping, preparing meals, housekeeping, doing laundry, using transportation, managing medications and managing finances) [Smoke Detector] : smoke detector [Carbon Monoxide Detector] : carbon monoxide detector [Seat Belt] :  uses seat belt [Sunscreen] : uses sunscreen [Designated Healthcare Proxy] : Designated healthcare proxy [Name: ___] : Health Care Proxy's Name: [unfilled]  [Relationship: ___] : Relationship: [unfilled] [Never] : Never [de-identified] : walks 3 miles per day [[id number]] : 0 [Change in mental status noted] : No change in mental status noted [Language] : denies difficulty with language [Behavior] : denies difficulty with behavior [Handling Complex Tasks] : denies difficulty handling complex tasks [Reasoning] : denies difficulty with reasoning [Reports changes in hearing] : Reports no changes in hearing [Reports changes in vision] : Reports no changes in vision [Reports changes in dental health] : Reports no changes in dental health [MammogramDate] : 8/2023 [MammogramComments] : Dr. Cage [PapSmearDate] : 6/2023 [PapSmearComments] : Dr. Cage [ColonoscopyDate] : 2 years ago  [ColonoscopyComments] : Dr. 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